# Patient Record
Sex: FEMALE | Race: WHITE | Employment: FULL TIME | URBAN - METROPOLITAN AREA
[De-identification: names, ages, dates, MRNs, and addresses within clinical notes are randomized per-mention and may not be internally consistent; named-entity substitution may affect disease eponyms.]

---

## 2017-12-17 ENCOUNTER — HOSPITAL ENCOUNTER (EMERGENCY)
Facility: CLINIC | Age: 22
Discharge: HOME | End: 2017-12-17
Attending: SPECIALIST
Payer: COMMERCIAL

## 2017-12-17 VITALS
TEMPERATURE: 98.7 F | HEART RATE: 98 BPM | HEIGHT: 63 IN | BODY MASS INDEX: 23.04 KG/M2 | SYSTOLIC BLOOD PRESSURE: 128 MMHG | WEIGHT: 130 LBS | RESPIRATION RATE: 18 BRPM | OXYGEN SATURATION: 99 % | DIASTOLIC BLOOD PRESSURE: 97 MMHG

## 2017-12-17 DIAGNOSIS — S61.411A LACERATION OF RIGHT HAND WITHOUT FOREIGN BODY, INITIAL ENCOUNTER: Primary | ICD-10-CM

## 2017-12-17 PROCEDURE — 12002 RPR S/N/AX/GEN/TRNK2.6-7.5CM: CPT

## 2017-12-17 PROCEDURE — 99282 EMERGENCY DEPT VISIT SF MDM: CPT

## 2017-12-17 PROCEDURE — 6370000000 HC RX 637 (ALT 250 FOR IP): Performed by: SPECIALIST

## 2017-12-17 PROCEDURE — 2500000003 HC RX 250 WO HCPCS: Performed by: SPECIALIST

## 2017-12-17 RX ORDER — LIDOCAINE HYDROCHLORIDE 10 MG/ML
20 INJECTION, SOLUTION INFILTRATION; PERINEURAL ONCE
Status: COMPLETED | OUTPATIENT
Start: 2017-12-17 | End: 2017-12-17

## 2017-12-17 RX ORDER — CEPHALEXIN 500 MG/1
500 CAPSULE ORAL 4 TIMES DAILY
Qty: 28 CAPSULE | Refills: 0 | Status: SHIPPED | OUTPATIENT
Start: 2017-12-17 | End: 2017-12-24

## 2017-12-17 RX ORDER — GINSENG 100 MG
CAPSULE ORAL ONCE
Status: COMPLETED | OUTPATIENT
Start: 2017-12-17 | End: 2017-12-17

## 2017-12-17 RX ADMIN — BACITRACIN: 500 OINTMENT TOPICAL at 16:29

## 2017-12-17 RX ADMIN — LIDOCAINE HYDROCHLORIDE 20 ML: 10 INJECTION, SOLUTION INFILTRATION; PERINEURAL at 15:49

## 2017-12-17 ASSESSMENT — PAIN SCALES - GENERAL: PAINLEVEL_OUTOF10: 0

## 2017-12-17 NOTE — ED PROVIDER NOTES
[facility]  [address] [address]  Phone: [phone]      Pt Name: Fabian Obrien  MRN: [id number]  [place] 1995  Date of evaluation: 12/17/2017      CHIEF COMPLAINT       Chief Complaint   Patient presents with    Laceration     right hand         HISTORY OF PRESENT ILLNESS    Fabian Obrien is a 25 y.o. female who presents   Chief Complaint   Patient presents with    Laceration     right hand   . 41-year-old female patient presents to the emergency department complaining of sustaining laceration on the ulnar aspect of the right hand at 3 a.m. in the morning about 12 hours prior to arrival while she was cleaning the glass which broke. Patient washed her hand applied the Neosporin ointment locally and went back to bed hoping that laceration was healing by itself. She woke up in the morning noticing some bleeding through the laceration and the gaping of the wound and thus she comes to the emergency department. She complains of throbbing pain 7 out of 10 in intensity worse with the movements and better with rest.  She denies any tingling, numbness or weakness distally. Last tetanus injection is 4 years ago. She denies any other injuries. Patient has not taken any pain medications so far. She is right-hand dominant. She is not on any antiplatelet or anticoagulant agents. REVIEW OF SYSTEMS       Review of Systems   Constitutional: Negative for fever. Skin: Positive for wound. Neurological: Negative for syncope, weakness and numbness. All other systems reviewed and are negative. PAST MEDICAL HISTORY    has no past medical history on file. SURGICAL HISTORY      has no past surgical history on file.     CURRENT MEDICATIONS       Discharge Medication List as of 12/17/2017  4:18 PM      CONTINUE these medications which have NOT CHANGED    Details   sertraline (ZOLOFT) 50 MG tablet Take 50 mg by mouth dailyHistorical Med             ALLERGIES     has No Known Department Physician who either signs or Co-signs this chart in the absence of a cardiologist.    None obtained    RADIOLOGY:   I directly visualized the following  images and reviewed the radiologist interpretations:  No orders to display      None obtained      LABS:  Labs Reviewed - No data to display      EMERGENCY DEPARTMENT COURSE:   Vitals:    Vitals:    12/17/17 1507   BP: (!) 128/97   Pulse: 98   Resp: 18   Temp: 98.7 °F (37.1 °C)   SpO2: 99%   Weight: 59 kg (130 lb)   Height: 5' 3\" (1.6 m)     -------------------------  BP: (!) 128/97, Temp: 98.7 °F (37.1 °C), Pulse: 98, Resp: 18    Orders Placed This Encounter   Medications    lidocaine 1 % injection 20 mL    cephALEXin (KEFLEX) 500 MG capsule     Sig: Take 1 capsule by mouth 4 times daily for 7 days     Dispense:  28 capsule     Refill:  0    bacitracin ointment       During emergency department course, the laceration was sutured repaired by myself under local anesthesia. Please see procedure note. Patient tolerated the procedure well. She was prescribed Keflex 500 mg 4 times daily for 7 days. She is advised to watch carefully for any signs of infection, take Tylenol and/or ibuprofen as needed, elevate the right hand, follow up with PCP, return if worse. She was advised to have sutures removed in 10 days. I have reviewed the disposition diagnosis with the patient and or their family/guardian. I have answered their questions and given discharge instructions. They voiced understanding of these instructions and did not have any further questions or complaints. Re-evaluation Notes    Upon reevaluation, patient is feeling much better and resting comfortably. PROCEDURES:  Laceration Repair Procedure Note    Indication: Laceration    Procedure: The patient was placed in the appropriate position and anesthesia around the laceration was obtained by infiltration using 1% Lidocaine without epinephrine.  The area was then cleansed with betadine and